# Patient Record
Sex: FEMALE | Race: BLACK OR AFRICAN AMERICAN | Employment: UNEMPLOYED | URBAN - METROPOLITAN AREA
[De-identification: names, ages, dates, MRNs, and addresses within clinical notes are randomized per-mention and may not be internally consistent; named-entity substitution may affect disease eponyms.]

---

## 2019-01-09 ENCOUNTER — HOSPITAL ENCOUNTER (OUTPATIENT)
Dept: NUCLEAR MEDICINE | Age: 64
Discharge: HOME | End: 2019-01-09
Attending: INTERNAL MEDICINE
Payer: MEDICAID

## 2019-01-09 ENCOUNTER — HOSPITAL ENCOUNTER (OUTPATIENT)
Dept: ULTRASOUND IMAGING | Age: 64
Discharge: HOME | End: 2019-01-09
Attending: INTERNAL MEDICINE
Payer: MEDICAID

## 2019-01-09 DIAGNOSIS — E83.52 HYPERCALCEMIA: ICD-10-CM

## 2019-01-09 DIAGNOSIS — E04.0 GOITER, SIMPLE: ICD-10-CM

## 2019-01-09 PROCEDURE — 76536 US EXAM OF HEAD AND NECK: CPT

## 2019-01-09 PROCEDURE — 78070 PARATHYROID PLANAR IMAGING: CPT

## 2019-02-22 ENCOUNTER — HOSPITAL ENCOUNTER (OUTPATIENT)
Dept: ULTRASOUND IMAGING | Age: 64
Discharge: HOME | End: 2019-02-22
Attending: SURGERY
Payer: MEDICAID

## 2019-02-22 DIAGNOSIS — E04.1 NONTOXIC UNINODULAR GOITER: ICD-10-CM

## 2019-02-22 PROCEDURE — 74011250636 HC RX REV CODE- 250/636: Performed by: RADIOLOGY

## 2019-02-22 PROCEDURE — 77030014115

## 2019-02-22 PROCEDURE — 88172 CYTP DX EVAL FNA 1ST EA SITE: CPT

## 2019-02-22 PROCEDURE — 10005 FNA BX W/US GDN 1ST LES: CPT

## 2019-02-22 PROCEDURE — 88173 CYTOPATH EVAL FNA REPORT: CPT

## 2019-02-22 PROCEDURE — 74011000250 HC RX REV CODE- 250

## 2019-02-22 RX ORDER — LIDOCAINE HYDROCHLORIDE 10 MG/ML
10 INJECTION, SOLUTION EPIDURAL; INFILTRATION; INTRACAUDAL; PERINEURAL
Status: COMPLETED | OUTPATIENT
Start: 2019-02-22 | End: 2019-02-22

## 2019-02-22 RX ADMIN — LIDOCAINE HYDROCHLORIDE 10 ML: 10 INJECTION, SOLUTION EPIDURAL; INFILTRATION; INTRACAUDAL; PERINEURAL at 09:44

## 2019-02-22 RX ADMIN — SODIUM BICARBONATE 10 ML: 0.2 INJECTION, SOLUTION INTRAVENOUS at 09:00

## 2020-07-24 VITALS — WEIGHT: 196 LBS | BODY MASS INDEX: 31.5 KG/M2 | HEIGHT: 66 IN

## 2020-07-24 PROBLEM — F33.9 RECURRENT MAJOR DEPRESSION (HCC): Status: ACTIVE | Noted: 2020-07-24

## 2020-07-24 RX ORDER — LISINOPRIL AND HYDROCHLOROTHIAZIDE 12.5; 2 MG/1; MG/1
TABLET ORAL DAILY
COMMUNITY
End: 2022-05-26

## 2020-07-24 RX ORDER — NITROGLYCERIN 0.4 MG/1
0.4 TABLET SUBLINGUAL
COMMUNITY

## 2020-07-24 RX ORDER — TRAZODONE HYDROCHLORIDE 150 MG/1
150 TABLET ORAL
COMMUNITY
End: 2020-09-21

## 2020-07-24 RX ORDER — CHOLECALCIFEROL (VITAMIN D3) 125 MCG
CAPSULE ORAL
COMMUNITY

## 2020-07-24 RX ORDER — ERYTHROMYCIN 5 MG/G
OINTMENT OPHTHALMIC
COMMUNITY

## 2020-07-24 RX ORDER — MECLIZINE HYDROCHLORIDE 25 MG/1
TABLET ORAL
COMMUNITY

## 2020-07-24 RX ORDER — GUAIFENESIN 100 MG/5ML
81 LIQUID (ML) ORAL DAILY
COMMUNITY

## 2020-07-24 RX ORDER — FLUTICASONE PROPIONATE 50 MCG
2 SPRAY, SUSPENSION (ML) NASAL DAILY
COMMUNITY

## 2020-07-24 RX ORDER — ALBUTEROL SULFATE 90 UG/1
AEROSOL, METERED RESPIRATORY (INHALATION)
COMMUNITY

## 2020-07-24 RX ORDER — FEXOFENADINE HCL 60 MG
TABLET ORAL
COMMUNITY

## 2020-07-24 RX ORDER — IBUPROFEN 800 MG/1
TABLET ORAL
COMMUNITY

## 2020-07-24 RX ORDER — POLYETHYLENE GLYCOL 3350, SODIUM CHLORIDE, SODIUM BICARBONATE, POTASSIUM CHLORIDE 420; 11.2; 5.72; 1.48 G/4L; G/4L; G/4L; G/4L
4000 POWDER, FOR SOLUTION ORAL
COMMUNITY
End: 2022-05-26

## 2020-07-24 RX ORDER — HYDROXYZINE HYDROCHLORIDE 10 MG/1
TABLET, FILM COATED ORAL
COMMUNITY

## 2020-07-24 RX ORDER — METHOCARBAMOL 500 MG/1
TABLET, FILM COATED ORAL 4 TIMES DAILY
COMMUNITY

## 2020-07-24 RX ORDER — ATORVASTATIN CALCIUM 80 MG/1
80 TABLET, FILM COATED ORAL DAILY
COMMUNITY

## 2020-07-24 RX ORDER — FAMOTIDINE 20 MG/1
20 TABLET, FILM COATED ORAL 2 TIMES DAILY
COMMUNITY

## 2020-07-24 RX ORDER — LIDOCAINE 50 MG/G
PATCH TOPICAL EVERY 24 HOURS
COMMUNITY

## 2020-07-24 RX ORDER — DULOXETIN HYDROCHLORIDE 60 MG/1
60 CAPSULE, DELAYED RELEASE ORAL DAILY
COMMUNITY
End: 2022-05-26

## 2020-07-24 RX ORDER — IBUPROFEN 200 MG
1 TABLET ORAL EVERY 24 HOURS
COMMUNITY

## 2020-07-24 RX ORDER — DILTIAZEM HYDROCHLORIDE 180 MG/1
CAPSULE, EXTENDED RELEASE ORAL DAILY
COMMUNITY

## 2020-07-24 RX ORDER — MELOXICAM 15 MG/1
15 TABLET ORAL DAILY
COMMUNITY

## 2020-07-24 RX ORDER — TOPIRAMATE 100 MG/1
TABLET, FILM COATED ORAL 2 TIMES DAILY
COMMUNITY

## 2020-07-24 RX ORDER — METAXALONE 800 MG/1
TABLET ORAL
COMMUNITY

## 2020-07-24 RX ORDER — ACYCLOVIR 50 MG/G
OINTMENT TOPICAL
COMMUNITY
End: 2022-05-26

## 2020-07-24 RX ORDER — FLUOXETINE 10 MG/1
CAPSULE ORAL DAILY
COMMUNITY
End: 2020-12-29 | Stop reason: DRUGHIGH

## 2020-07-24 RX ORDER — ONDANSETRON 4 MG/1
4 TABLET, FILM COATED ORAL
COMMUNITY
End: 2022-05-26

## 2020-07-24 RX ORDER — KETOROLAC TROMETHAMINE 10 MG/1
TABLET, FILM COATED ORAL
COMMUNITY

## 2020-07-24 RX ORDER — CYCLOBENZAPRINE HCL 10 MG
TABLET ORAL
COMMUNITY

## 2020-07-24 RX ORDER — MONTELUKAST SODIUM 10 MG/1
10 TABLET ORAL DAILY
COMMUNITY

## 2020-07-24 RX ORDER — CARVEDILOL 3.12 MG/1
TABLET ORAL 2 TIMES DAILY WITH MEALS
COMMUNITY

## 2020-07-24 RX ORDER — NAPROXEN 500 MG/1
500 TABLET ORAL 2 TIMES DAILY WITH MEALS
COMMUNITY

## 2020-07-24 RX ORDER — PRAZOSIN HYDROCHLORIDE 5 MG/1
CAPSULE ORAL
COMMUNITY

## 2020-07-24 RX ORDER — DILTIAZEM HYDROCHLORIDE 180 MG/1
180 CAPSULE, EXTENDED RELEASE ORAL DAILY
COMMUNITY

## 2020-09-21 RX ORDER — TRAZODONE HYDROCHLORIDE 150 MG/1
TABLET ORAL
Qty: 90 TAB | Refills: 0 | Status: SHIPPED | OUTPATIENT
Start: 2020-09-21 | End: 2021-01-04

## 2020-10-29 ENCOUNTER — TRANSCRIBE ORDER (OUTPATIENT)
Dept: SCHEDULING | Age: 65
End: 2020-10-29

## 2020-10-29 DIAGNOSIS — M25.511 RIGHT SHOULDER PAIN: ICD-10-CM

## 2020-10-29 DIAGNOSIS — M54.10 RADICULITIS: ICD-10-CM

## 2020-10-29 DIAGNOSIS — M62.9 MUSCULOSKELETAL DISORDER INVOLVING UPPER TRAPEZIUS MUSCLE: ICD-10-CM

## 2020-10-29 DIAGNOSIS — M75.41 IMPINGEMENT SYNDROME OF RIGHT SHOULDER: ICD-10-CM

## 2020-10-29 DIAGNOSIS — M77.9 TENDONITIS: ICD-10-CM

## 2020-10-29 DIAGNOSIS — M54.2 NECK PAIN: Primary | ICD-10-CM

## 2020-11-10 ENCOUNTER — HOSPITAL ENCOUNTER (OUTPATIENT)
Dept: MRI IMAGING | Age: 65
Discharge: HOME | End: 2020-11-10
Attending: ORTHOPAEDIC SURGERY
Payer: MEDICAID

## 2020-11-10 DIAGNOSIS — M54.2 NECK PAIN: ICD-10-CM

## 2020-11-10 DIAGNOSIS — M62.9 MUSCULOSKELETAL DISORDER INVOLVING UPPER TRAPEZIUS MUSCLE: ICD-10-CM

## 2020-11-10 DIAGNOSIS — M25.511 RIGHT SHOULDER PAIN: ICD-10-CM

## 2020-11-10 DIAGNOSIS — M77.9 TENDONITIS: ICD-10-CM

## 2020-11-10 DIAGNOSIS — M75.41 IMPINGEMENT SYNDROME OF RIGHT SHOULDER: ICD-10-CM

## 2020-11-10 DIAGNOSIS — M54.10 RADICULITIS: ICD-10-CM

## 2020-11-10 PROCEDURE — 73221 MRI JOINT UPR EXTREM W/O DYE: CPT

## 2020-11-10 PROCEDURE — 72141 MRI NECK SPINE W/O DYE: CPT

## 2020-12-29 DIAGNOSIS — F32.3 MAJOR DEPRESSIVE DISORDER, SINGLE EPISODE, SEVERE WITH PSYCHOTIC FEATURES (HCC): ICD-10-CM

## 2020-12-29 RX ORDER — FLUOXETINE HYDROCHLORIDE 20 MG/1
CAPSULE ORAL
Qty: 90 CAP | Refills: 1 | Status: SHIPPED | OUTPATIENT
Start: 2020-12-29 | End: 2022-05-26

## 2020-12-29 RX ORDER — OLANZAPINE 15 MG/1
TABLET ORAL
Qty: 90 TAB | Refills: 1 | Status: SHIPPED | OUTPATIENT
Start: 2020-12-29

## 2021-01-04 RX ORDER — TRAZODONE HYDROCHLORIDE 150 MG/1
TABLET ORAL
Qty: 90 TAB | Refills: 0 | Status: SHIPPED | OUTPATIENT
Start: 2021-01-04 | End: 2021-04-02

## 2021-01-28 ENCOUNTER — VIRTUAL VISIT (OUTPATIENT)
Dept: BEHAVIORAL/MENTAL HEALTH CLINIC | Age: 66
End: 2021-01-28
Payer: MEDICAID

## 2021-01-28 DIAGNOSIS — F33.41 RECURRENT MAJOR DEPRESSION IN PARTIAL REMISSION (HCC): Primary | ICD-10-CM

## 2021-01-28 PROCEDURE — 99441 PR PHYS/QHP TELEPHONE EVALUATION 5-10 MIN: CPT | Performed by: NURSE PRACTITIONER

## 2021-01-28 NOTE — PROGRESS NOTES
Tim Gusman is a 63 y.o. female who presents today for the following:  Chief Complaint   Patient presents with    Follow-up     \"I'm feeling much better. \"    Medication Management    Depression     Tim Gusman, who was evaluated through a synchronous (real-time) audio telephone encounter, and/or her healthcare decision maker, is aware that it is a billable service, with coverage as determined by her insurance carrier. She provided verbal consent to proceed: YES Consent obtained within past 12 months: Yes, and patient identification was verified. It was conducted pursuant to the emergency declaration under the [facility], [address] [place] [address] authority and the [employer] Act. A caregiver was present when appropriate. Ability to conduct physical exam was limited. I was home. The patient was home. Length of telephone visit 6 minutes and 36 seconds. No Known Allergies    Current Outpatient Medications   Medication Sig    traZODone (DESYREL) 150 mg tablet TAKE 1 TABLET BY MOUTH EVERYDAY AT BEDTIME    FLUoxetine (PROzac) 20 mg capsule TAKE ONE CAPSULE BY MOUTH EVERY MORNING FOR DEPRESSION    OLANZapine (ZyPREXA) 15 mg tablet TAKE 1 TABLET BY MOUTH EVERYDAY AT BEDTIME    acyclovir (ZOVIRAX) 5 % ointment Apply  to affected area every three (3) hours.  aspirin 81 mg chewable tablet Take 81 mg by mouth daily.  atorvastatin (LIPITOR) 80 mg tablet Take 80 mg by mouth daily.  carvediloL (COREG) 3.125 mg tablet Take  by mouth two (2) times daily (with meals).  cholecalciferol, vitamin D3, 50 mcg (2,000 unit) tab Take  by mouth.  cyclobenzaprine (FLEXERIL) 10 mg tablet Take  by mouth three (3) times daily as needed for Muscle Spasm(s).  DICLOFENAC SODIUM PO Take  by mouth.  dilTIAZem ER (DILACOR XR) 180 mg capsule Take  by mouth daily.     DULoxetine (CYMBALTA) 60 mg capsule Take 60 mg by mouth daily.    erythromycin (ILOTYCIN) ophthalmic ointment Administer  to both eyes nightly.  famotidine (PEPCID) 20 mg tablet Take 20 mg by mouth two (2) times a day.  fexofenadine (ALLEGRA) 60 mg tablet Take  by mouth.  fluticasone propionate (FLONASE) 50 mcg/actuation nasal spray 2 Sprays by Both Nostrils route daily.  hydrOXYzine HCL (ATARAX) 10 mg tablet Take  by mouth three (3) times daily as needed.  ibuprofen (MOTRIN) 800 mg tablet Take  by mouth.  ketorolac (TORADOL) 10 mg tablet Take  by mouth.  lidocaine (LIDODERM) 5 % by TransDERmal route every twenty-four (24) hours. Apply patch to the affected area for 12 hours a day and remove for 12 hours a day.  lisinopril-hydroCHLOROthiazide (PRINZIDE, ZESTORETIC) 20-12.5 mg per tablet Take  by mouth daily.  meclizine (ANTIVERT) 25 mg tablet Take  by mouth three (3) times daily as needed for Dizziness.  meloxicam (MOBIC) 15 mg tablet Take 15 mg by mouth daily.  metaxalone (SKELAXIN) 800 mg tablet Take  by mouth.  methocarbamoL (ROBAXIN) 500 mg tablet Take  by mouth four (4) times daily.  montelukast (SINGULAIR) 10 mg tablet Take 10 mg by mouth daily.  naproxen (NAPROSYN) 500 mg tablet Take 500 mg by mouth two (2) times daily (with meals).  nicotine (NICODERM CQ) 21 mg/24 hr 1 Patch by TransDERmal route every twenty-four (24) hours.  nitroglycerin (NITROSTAT) 0.4 mg SL tablet 0.4 mg by SubLINGual route every five (5) minutes as needed for Chest Pain. Up to 3 doses.  ondansetron hcl (ZOFRAN) 4 mg tablet Take 4 mg by mouth every eight (8) hours as needed for Nausea or Vomiting.  peg-electrolyte soln (NULYTELY) 420 gram solution Take 4,000 mL by mouth now.  prazosin (MINIPRESS) 5 mg capsule Take  by mouth nightly.  dilTIAZem ER (Tiadylt ER) 180 mg capsule Take 180 mg by mouth daily.  topiramate (TOPAMAX) 100 mg tablet Take  by mouth two (2) times a day.     albuterol (Ventolin HFA) 90 mcg/actuation inhaler Take by inhalation.  amLODIPine (NORVASC) 5 mg tablet Take 5 mg by mouth daily.  pravastatin (PRAVACHOL) 40 mg tablet Take 40 mg by mouth nightly.  hydrochlorothiazide (HYDRODIURIL) 25 mg tablet Take 25 mg by mouth daily. No current facility-administered medications for this visit. Past Medical History:   Diagnosis Date    Anxiety disorder     Depression     Headache     HTN (hypertension)     Hypercholesterolemia     Stroke [facility])        History reviewed. No pertinent surgical history.     Family History   Problem Relation Age of Onset    Diabetes Mother     Heart Disease Father        Social History     Socioeconomic History    Marital status: SINGLE     Spouse name: Not on file    Number of children: Not on file    Years of education: Not on file    Highest education level: Not on file   Occupational History    Not on file   Social Needs    Financial resource strain: Not on file    Food insecurity     Worry: Not on file     Inability: Not on file    Transportation needs     Medical: Not on file     Non-medical: Not on file   Tobacco Use    Smoking status: Current Every Day Smoker     Packs/day: 1.00    Smokeless tobacco: Never Used   Substance and Sexual Activity    Alcohol use: No    Drug use: No    Sexual activity: Not on file   Lifestyle    Physical activity     Days per week: Not on file     Minutes per session: Not on file    Stress: Not on file   Relationships    Social connections     Talks on phone: Not on file     Gets together: Not on file     Attends Yazdanism service: Not on file     Active member of club or organization: Not on file     Attends meetings of clubs or organizations: Not on file     Relationship status: Not on file    Intimate partner violence     Fear of current or ex partner: Not on file     Emotionally abused: Not on file     Physically abused: Not on file     Forced sexual activity: Not on file   Other Topics Concern    Not on file Social History Narrative    Not on file         Ms. Luanne He is unable to do virtual visit, telephone visit required. Patient follows up in the clinic for major depression with psychotic features. She last visited the clinic June 22, 2020. On last visit, Zyprexa was increased to 15 mg tablet take 1 tablet at bedtime. She continues Prozac 20 mg capsule take one capsule daily, trazodone 150 mg tablet take one tablet at bedtime and duloxetine 60 mg capsule take one capsule twice daily. Patient reports euthymic mood. She denies feeling depressed or anxious. She reports stable sleep and fluctuating appetite. Patient denies psychotic symptoms and none noted during phone interview. No suicidal or homicidal thinking noted, risk for suicide is low, protective factor-family. Patient reports smoking about 1 pack of cigarettes daily. She denies alcohol and drug use. Patient shares that her 80-year-old nephew is in ICU with Covid. She reports that she is worried about his recovery but she continues to be supportive and prayerful. Review of Systems   Musculoskeletal: Positive for joint pain. All other systems reviewed and are negative. There were no vitals taken for this visit. Physical Exam  Psychiatric:         Attention and Perception: Attention and perception normal.         Mood and Affect: Mood normal.         Speech: Speech normal.         Behavior: Behavior normal. Behavior is cooperative. Thought Content: Thought content normal.         Cognition and Memory: Cognition and memory normal.         Judgment: Judgment normal.        Plan:    Continue the above medications as prescribed. Advised patient to avoid smoking, alcohol and drug use. Advised patient to call 911 or go to the emergency department for suicidal/homicidal thinking. Follow-up with primary care provider as appropriate.

## 2021-04-02 RX ORDER — TRAZODONE HYDROCHLORIDE 150 MG/1
TABLET ORAL
Qty: 90 TAB | Refills: 0 | Status: SHIPPED | OUTPATIENT
Start: 2021-04-02 | End: 2022-05-26

## 2022-05-26 ENCOUNTER — OFFICE VISIT (OUTPATIENT)
Dept: ENDOCRINOLOGY | Age: 67
End: 2022-05-26
Payer: MEDICAID

## 2022-05-26 VITALS
TEMPERATURE: 97.7 F | WEIGHT: 175 LBS | BODY MASS INDEX: 28.12 KG/M2 | SYSTOLIC BLOOD PRESSURE: 125 MMHG | OXYGEN SATURATION: 99 % | DIASTOLIC BLOOD PRESSURE: 76 MMHG | HEIGHT: 66 IN | HEART RATE: 70 BPM

## 2022-05-26 DIAGNOSIS — I10 BENIGN ESSENTIAL HTN: ICD-10-CM

## 2022-05-26 DIAGNOSIS — E04.2 MULTIPLE THYROID NODULES: ICD-10-CM

## 2022-05-26 DIAGNOSIS — E83.52 HYPERCALCEMIA: Primary | ICD-10-CM

## 2022-05-26 DIAGNOSIS — Z78.0 MENOPAUSE: ICD-10-CM

## 2022-05-26 PROCEDURE — 99204 OFFICE O/P NEW MOD 45 MIN: CPT | Performed by: INTERNAL MEDICINE

## 2022-05-26 RX ORDER — AMLODIPINE BESYLATE 10 MG/1
10 TABLET ORAL DAILY
COMMUNITY
Start: 2022-05-09

## 2022-05-26 RX ORDER — QUETIAPINE FUMARATE 200 MG/1
200 TABLET, FILM COATED ORAL
COMMUNITY
Start: 2022-04-11

## 2022-05-26 RX ORDER — FUROSEMIDE 20 MG/1
TABLET ORAL
COMMUNITY
Start: 2022-05-17

## 2022-05-26 RX ORDER — HYDRALAZINE HYDROCHLORIDE 25 MG/1
25 TABLET, FILM COATED ORAL 2 TIMES DAILY
Qty: 60 TABLET | Refills: 3 | Status: SHIPPED | OUTPATIENT
Start: 2022-05-26 | End: 2022-06-25

## 2022-05-26 NOTE — PROGRESS NOTES
History and Physical    Patient: Cynthia Martinez MRN: [id number]  SSN: [id number]    YOB: 1955  Age: 61 y.o. Sex: female      Subjective:      Cynthia Martinez is a 61 y.o. female with past medical history of hypertension, hyperlipidemia, migraines, CVA, anxiety, depression is sent to me by primary care provider ELAN Sinha for hypercalcemia and thyroid nodules. Onset: Since a long time, she was evaluated by another endocrinologist many years back. She does not remember the details. Symptoms of hypercalcemia: She has some polyuria, polydipsia, fatigue. Denies any constipation, never had fractures, kidney stones. Lithium/ HCTZ: She takes hydrochlorothiazide 25 mg daily for hypertension and lower extremity edema. Never been on lithium. Calcium supplement: No calcium tablets, no significant dairy intake because she is lactose intolerant    Vit D supplement: No    Osteoporosis/fragility fracture: No fractures, never had bone density scan    Dental issues: No current issues    Family history of hypercalcemia/hyperparathyroidism/osteoporosis: None to her knowledge    Thyroid nodules: This was found a few years back. She had biopsy in 2019 and it was resulted as benign. She denies any difficulty in swallowing, difficulty in breathing, hoarseness of voice. No history of radiation exposure to head or neck region. No family history of thyroid cancer. No personal history of any other cancers or precancerous conditions. Past Medical History:   Diagnosis Date    Anxiety disorder     Depression     Headache     HTN (hypertension)     Hypercalcemia     Hypercholesterolemia     Migraine     Stroke [facility])      History reviewed. No pertinent surgical history.    Family History   Problem Relation Age of Onset    Diabetes Mother     Heart Disease Father      Social History     Tobacco Use    Smoking status: Former Smoker     Packs/day: 1.00     Quit date: 11/26/2021     Years since quittin.4    Smokeless tobacco: Never Used   Substance Use Topics    Alcohol use: No      Prior to Admission medications    Medication Sig Start Date End Date Taking? Authorizing Provider   furosemide (LASIX) 20 mg tablet TAKE 1 TABLET BY MOUTH EVERY DAY FOR 30 DAYS 22  Yes Provider, Historical   ubrogepant [employer]) 100 mg tablet Ubrelvy 100 mg tablet   take 1 tablet by mouth daily as needed for migraine. may repeat 1/2 tablet dose x 1 after 2h   Yes Provider, Historical   amLODIPine (NORVASC) 10 mg tablet Take 10 mg by mouth daily. 22  Yes Provider, Historical   hydrALAZINE (APRESOLINE) 25 mg tablet Take 1 Tablet by mouth two (2) times a day for 30 days. 22 Yes Mac Chan MD   OLANZapine (ZyPREXA) 15 mg tablet TAKE 1 TABLET BY MOUTH EVERYDAY AT BEDTIME 20  Yes Carlos Mahan NP   aspirin 81 mg chewable tablet Take 81 mg by mouth daily. Yes Provider, Historical   atorvastatin (LIPITOR) 80 mg tablet Take 80 mg by mouth daily. Yes Provider, Historical   carvediloL (COREG) 3.125 mg tablet Take  by mouth two (2) times daily (with meals). Yes Provider, Historical   cholecalciferol, vitamin D3, 50 mcg (2,000 unit) tab Take  by mouth. Yes Provider, Historical   cyclobenzaprine (FLEXERIL) 10 mg tablet Take  by mouth three (3) times daily as needed for Muscle Spasm(s). Yes Provider, Historical   DICLOFENAC SODIUM PO Take  by mouth. Yes Provider, Historical   dilTIAZem ER (DILACOR XR) 180 mg capsule Take  by mouth daily. Yes Provider, Historical   erythromycin (ILOTYCIN) ophthalmic ointment Administer  to both eyes nightly. Yes Provider, Historical   famotidine (PEPCID) 20 mg tablet Take 20 mg by mouth two (2) times a day. Yes Provider, Historical   fexofenadine (ALLEGRA) 60 mg tablet Take  by mouth. Yes Provider, Historical   fluticasone propionate (FLONASE) 50 mcg/actuation nasal spray 2 Sprays by Both Nostrils route daily.    Yes Provider, Historical hydrOXYzine HCL (ATARAX) 10 mg tablet Take  by mouth three (3) times daily as needed. Yes Provider, Historical   lidocaine (LIDODERM) 5 % by TransDERmal route every twenty-four (24) hours. Apply patch to the affected area for 12 hours a day and remove for 12 hours a day. Yes Provider, Historical   meclizine (ANTIVERT) 25 mg tablet Take  by mouth three (3) times daily as needed for Dizziness. Yes Provider, Historical   meloxicam (MOBIC) 15 mg tablet Take 15 mg by mouth daily. Yes Provider, Historical   metaxalone (SKELAXIN) 800 mg tablet Take  by mouth. Yes Provider, Historical   methocarbamoL (ROBAXIN) 500 mg tablet Take  by mouth four (4) times daily. Yes Provider, Historical   montelukast (SINGULAIR) 10 mg tablet Take 10 mg by mouth daily. Yes Provider, Historical   naproxen (NAPROSYN) 500 mg tablet Take 500 mg by mouth two (2) times daily (with meals). Yes Provider, Historical   nicotine (NICODERM CQ) 21 mg/24 hr 1 Patch by TransDERmal route every twenty-four (24) hours. Yes Provider, Historical   nitroglycerin (NITROSTAT) 0.4 mg SL tablet 0.4 mg by SubLINGual route every five (5) minutes as needed for Chest Pain. Up to 3 doses. Yes Provider, Historical   prazosin (MINIPRESS) 5 mg capsule Take  by mouth nightly. Yes Provider, Historical   dilTIAZem ER (Tiadylt ER) 180 mg capsule Take 180 mg by mouth daily. Yes Provider, Historical   topiramate (TOPAMAX) 100 mg tablet Take  by mouth two (2) times a day. Yes Provider, Historical   albuterol (Ventolin HFA) 90 mcg/actuation inhaler Take  by inhalation. Yes Provider, Historical   hydrochlorothiazide (HYDRODIURIL) 25 mg tablet Take 25 mg by mouth daily. Yes Provider, Historical   QUEtiapine (SEROquel) 200 mg tablet Take 200 mg by mouth nightly. Patient not taking: Reported on 5/26/2022 4/11/22   Provider, Historical   ibuprofen (MOTRIN) 800 mg tablet Take  by mouth.   Patient not taking: Reported on 5/26/2022    Provider, Historical ketorolac (TORADOL) 10 mg tablet Take  by mouth. Patient not taking: Reported on 5/26/2022    Provider, Historical        Allergies   Allergen Reactions    Iodinated Contrast Media Unknown (comments)    Lisinopril Angioedema       Review of Systems:  ROS    A comprehensive review of systems was preformed and it is negative except fatigue, cold sensitivity, dry skin, frequent cough, wheezing, swelling of hands and feet, gastric reflux, anxiety, depression, back pain    Objective:     Vitals:    05/26/22 1433   BP: 125/76   Pulse: 70   Temp: 97.7 °F (36.5 °C)   TempSrc: Temporal   SpO2: 99%   Weight: 175 lb (79.4 kg)   Height: 5' 6\" (1.676 m)        Physical Exam:    Physical Exam  Vitals and nursing note reviewed. Constitutional:       Appearance: Normal appearance. HENT:      Head: Normocephalic and atraumatic. Eyes:      Extraocular Movements: Extraocular movements intact. Neck:      Comments: Thyroid feels irregular on on palpation, no discrete nodule palpated, no cervical lymphadenopathy  Cardiovascular:      Rate and Rhythm: Normal rate and regular rhythm. Pulmonary:      Effort: Pulmonary effort is normal.      Breath sounds: Normal breath sounds. Musculoskeletal:      Cervical back: Neck supple. Neurological:      General: No focal deficit present. Mental Status: She is alert and oriented to person, place, and time. Psychiatric:         Mood and Affect: Mood normal.         Behavior: Behavior normal.          Labs and Imaging:    Last 3 Recorded Weights in this Encounter    05/26/22 1433   Weight: 175 lb (79.4 kg)        No results found for: HBA1C, WRD4AWHD, JYD3XCAU, GPK8ELUQ     Assessment:     Patient Active Problem List   Diagnosis Code    Atypical ductal hyperplasia of breast N60.99    HTN (hypertension) I10    Recurrent major depression ([facility] [address] F33.9           Plan:     Hypercalcemia:  I reviewed labs and notes from the referring provider's office.     11-:  Ionized calcium slightly elevated at 5.8 (4.55.6)  Phosphorus normal at 4.2  Magnesium normal at 2.3  25 hydroxy vitamin D normal at 41.8    3-:  Calcium normal at 10.3 (8.710.3)  Alkaline phosphatase elevated 185 (44121  TSH normal at 1.41  25 hydroxy vitamin D normal at 55.2  Phosphorus normal at 3.9  Magnesium normal at 2.3  PTH normal at 25  Ionized calcium normal at 5.5    Old records show parathyroid scan was done but did not localize parathyroid adenoma in January 2019    It is possible that she has calcium elevation on and off because of hydrochlorothiazide. Plan:  Stop hydrochlorothiazide for 1 month and replace it with hydralazine 25 mg twice a day to help with hypertension. No restriction on dietary calcium intake. Get labs done in 1 month, prior to next visit. Essential hypertension:  Currently on amlodipine 10 mg, carvedilol 3.125 mg twice a day and hydrochlorothiazide 25 mg daily. Stop hydrochlorothiazide and replace it with hydralazine 25 mg twice a day, because of hypercalcemia. Thyroid nodules:  Thyroid ultrasound 1-9-2019: 1.7 cm solid and cystic nodule in the right midpole  1.3 cm cystic nodule in the left isthmus  Rest of the nodules are subcentimeter    On 2- left lobe dominant nodule (not sure which one) was biopsied and resulted as benign. [id number]: TSH normal at 1.41  Plan:  Repeat thyroid ultrasound. Menopause:  Bone density scan to screen for osteoporosis.     Orders Placed This Encounter    US THYROID/PARATHYROID/SOFT TISS     Standing Status:   Future     Standing Expiration Date:   6/26/2023    DEXA BONE DENSITY STUDY AXIAL     Standing Status:   Future     Standing Expiration Date:   6/26/2023     Order Specific Question:   Reason for Exam     Answer:   screening for osteoporosis    METABOLIC PANEL, COMPREHENSIVE    PTH INTACT    PTH-RELATED PEPTIDE    PROTEIN ELECTROPHORESIS    PHOSPHORUS    MAGNESIUM    VITAMIN D, 1, 25 DIHYDROXY    CBC WITH AUTOMATED DIFF    hydrALAZINE (APRESOLINE) 25 mg tablet     Sig: Take 1 Tablet by mouth two (2) times a day for 30 days.      Dispense:  60 Tablet     Refill:  3     DC HCTZ        Signed By: Janie Velez MD     May 26, 2022      Return to clinic 6 weeks

## 2022-05-26 NOTE — LETTER
5/26/2022    Patient: Yasmeen Cavazos   YOB: 1955   Date of Visit: 5/26/2022     Marcos Rojo NP  [address] [place] [id number]  Via Fax: [phone]     Naun Garcia, [address] [place] [address]  Via Fax: [phone]    Dear SHERLEY Lees, [facility],      Thank you for referring Ms. Saturnino Thompson to [address] [place] [address] for evaluation. My notes for this consultation are attached. If you have questions, please do not hesitate to call me. I look forward to following your patient along with you.       Sincerely,    Naz Cantor MD

## 2022-05-26 NOTE — PATIENT INSTRUCTIONS
Hold hydrochlorothiazide starting tomorrow, start hydralazine 25 mg twice a day instead. and get labs done 1 week before next appointment. Do not need to fast for labs.

## 2022-06-25 LAB
1,25(OH)2D SERPL-MCNC: 47.9 PG/ML (ref 24.8–81.5)
ALBUMIN SERPL ELPH-MCNC: 4 G/DL (ref 2.9–4.4)
ALBUMIN SERPL-MCNC: 4.5 G/DL (ref 3.8–4.8)
ALBUMIN/GLOB SERPL: 1.2 {RATIO} (ref 0.7–1.7)
ALBUMIN/GLOB SERPL: 1.6 {RATIO} (ref 1.2–2.2)
ALP SERPL-CCNC: 158 IU/L (ref 44–121)
ALPHA1 GLOB SERPL ELPH-MCNC: 0.2 G/DL (ref 0–0.4)
ALPHA2 GLOB SERPL ELPH-MCNC: 0.7 G/DL (ref 0.4–1)
ALT SERPL-CCNC: 31 IU/L (ref 0–32)
AST SERPL-CCNC: 31 IU/L (ref 0–40)
B-GLOBULIN SERPL ELPH-MCNC: 1 G/DL (ref 0.7–1.3)
BASOPHILS # BLD AUTO: 0.1 X10E3/UL (ref 0–0.2)
BASOPHILS NFR BLD AUTO: 1 %
BILIRUB SERPL-MCNC: 0.3 MG/DL (ref 0–1.2)
BUN SERPL-MCNC: 11 MG/DL (ref 8–27)
BUN/CREAT SERPL: 14 (ref 12–28)
CALCIUM SERPL-MCNC: 10.4 MG/DL (ref 8.7–10.3)
CHLORIDE SERPL-SCNC: 103 MMOL/L (ref 96–106)
CO2 SERPL-SCNC: 22 MMOL/L (ref 20–29)
CREAT SERPL-MCNC: 0.8 MG/DL (ref 0.57–1)
EGFR: 82 ML/MIN/1.73
EOSINOPHIL # BLD AUTO: 0.3 X10E3/UL (ref 0–0.4)
EOSINOPHIL NFR BLD AUTO: 4 %
ERYTHROCYTE [DISTWIDTH] IN BLOOD BY AUTOMATED COUNT: 11.4 % (ref 11.7–15.4)
GAMMA GLOB SERPL ELPH-MCNC: 1.3 G/DL (ref 0.4–1.8)
GLOBULIN SER CALC-MCNC: 2.8 G/DL (ref 1.5–4.5)
GLOBULIN SER CALC-MCNC: 3.3 G/DL (ref 2.2–3.9)
GLUCOSE SERPL-MCNC: 160 MG/DL (ref 65–99)
HCT VFR BLD AUTO: 41.7 % (ref 34–46.6)
HGB BLD-MCNC: 13.8 G/DL (ref 11.1–15.9)
IMM GRANULOCYTES # BLD AUTO: 0 X10E3/UL (ref 0–0.1)
IMM GRANULOCYTES NFR BLD AUTO: 0 %
LYMPHOCYTES # BLD AUTO: 2.5 X10E3/UL (ref 0.7–3.1)
LYMPHOCYTES NFR BLD AUTO: 42 %
M PROTEIN SERPL ELPH-MCNC: NORMAL G/DL
MAGNESIUM SERPL-MCNC: 2.1 MG/DL (ref 1.6–2.3)
MCH RBC QN AUTO: 30.4 PG (ref 26.6–33)
MCHC RBC AUTO-ENTMCNC: 33.1 G/DL (ref 31.5–35.7)
MCV RBC AUTO: 92 FL (ref 79–97)
MONOCYTES # BLD AUTO: 0.5 X10E3/UL (ref 0.1–0.9)
MONOCYTES NFR BLD AUTO: 8 %
NEUTROPHILS # BLD AUTO: 2.7 X10E3/UL (ref 1.4–7)
NEUTROPHILS NFR BLD AUTO: 45 %
PHOSPHATE SERPL-MCNC: 4.8 MG/DL (ref 3–4.3)
PLATELET # BLD AUTO: 336 X10E3/UL (ref 150–450)
PLEASE NOTE, 011150: NORMAL
POTASSIUM SERPL-SCNC: 4.7 MMOL/L (ref 3.5–5.2)
PROT SERPL-MCNC: 7.3 G/DL (ref 6–8.5)
PTH RELATED PROT SERPL-SCNC: <2 PMOL/L
PTH-INTACT SERPL-MCNC: 33 PG/ML (ref 15–65)
RBC # BLD AUTO: 4.54 X10E6/UL (ref 3.77–5.28)
SODIUM SERPL-SCNC: 141 MMOL/L (ref 134–144)
WBC # BLD AUTO: 5.9 X10E3/UL (ref 3.4–10.8)